# Patient Record
Sex: FEMALE | Race: WHITE | NOT HISPANIC OR LATINO | URBAN - METROPOLITAN AREA
[De-identification: names, ages, dates, MRNs, and addresses within clinical notes are randomized per-mention and may not be internally consistent; named-entity substitution may affect disease eponyms.]

---

## 2022-04-27 ENCOUNTER — TELEPHONE (OUTPATIENT)
Dept: DERMATOLOGY | Age: 62
End: 2022-04-27

## 2022-04-27 NOTE — TELEPHONE ENCOUNTER
Called to schedule an appt via Dr Laban Meigs wait list  Phone number on file is not in service  Yaakov fisher

## 2022-04-27 NOTE — LETTER
[facility] [address]   [phone]      April 27, 2022      Cherry Martinez    We have tried to reach you regarding scheduling an appointment, however the phone number we have on file is no longer in service   Please give our office a call at [phone] to schedule and update your file      Talk to you soon,    [facility]

## 2023-05-15 ENCOUNTER — OFFICE VISIT (OUTPATIENT)
Age: 63
End: 2023-05-15

## 2023-05-15 VITALS — HEIGHT: 65 IN | WEIGHT: 131 LBS | BODY MASS INDEX: 21.83 KG/M2 | TEMPERATURE: 98.3 F

## 2023-05-15 DIAGNOSIS — D22.70 MULTIPLE BENIGN MELANOCYTIC NEVI OF UPPER AND LOWER EXTREMITIES AND TRUNK: ICD-10-CM

## 2023-05-15 DIAGNOSIS — L30.9 DERMATITIS: ICD-10-CM

## 2023-05-15 DIAGNOSIS — D22.5 MULTIPLE BENIGN MELANOCYTIC NEVI OF UPPER AND LOWER EXTREMITIES AND TRUNK: ICD-10-CM

## 2023-05-15 DIAGNOSIS — L81.4 SOLAR LENTIGO: Primary | ICD-10-CM

## 2023-05-15 DIAGNOSIS — D23.9 DERMATOFIBROMA: ICD-10-CM

## 2023-05-15 DIAGNOSIS — D48.5 NEOPLASM OF UNCERTAIN BEHAVIOR OF SKIN: ICD-10-CM

## 2023-05-15 DIAGNOSIS — D22.60 MULTIPLE BENIGN MELANOCYTIC NEVI OF UPPER AND LOWER EXTREMITIES AND TRUNK: ICD-10-CM

## 2023-05-15 DIAGNOSIS — L82.1 SEBORRHEIC KERATOSIS: ICD-10-CM

## 2023-05-15 DIAGNOSIS — D18.01 CHERRY ANGIOMA: ICD-10-CM

## 2023-05-15 RX ORDER — ATENOLOL 25 MG/1
25 TABLET ORAL DAILY
COMMUNITY
Start: 2023-03-29

## 2023-05-15 RX ORDER — DIGOXIN 250 MCG
TABLET ORAL
COMMUNITY
Start: 2023-03-24

## 2023-05-15 RX ORDER — SERTRALINE HYDROCHLORIDE 150 MG/1
CAPSULE ORAL
COMMUNITY

## 2023-05-15 RX ORDER — HYDROXYCHLOROQUINE SULFATE 200 MG/1
TABLET, FILM COATED ORAL
COMMUNITY

## 2023-05-15 RX ORDER — IPRATROPIUM BROMIDE 42 UG/1
2 SPRAY, METERED NASAL DAILY
COMMUNITY
Start: 2023-04-17

## 2023-05-15 NOTE — PROGRESS NOTES
"[facility] Note     Patient Name: Fariba Andrea  Encounter Date: 05/15/2023     Have you been cared for by a [facility] in the last 3 years and, if so, which description applies to you? NO  I am considered a \"new\" patient and must complete all patient intake questions  I am FEMALE/of child-bearing potential     REVIEW OF SYSTEMS:  Have you recently had or currently have any of the following? · Recent fever or chills? No  · Any non-healing wound? No  · Are you pregnant or planning to become pregnant? No  · Are you currently or planning to be nursing or breast feeding? No   PAST MEDICAL HISTORY:  Have you personally ever had or currently have any of the following? If \"YES,\" then please provide more detail  · Skin cancer (such as Melanoma, Basal Cell Carcinoma, Squamous Cell Carcinoma? No  · Tuberculosis, HIV/AIDS, Hepatitis B or C: No  · Systemic Immunosuppression such as Diabetes, Biologic or Immunotherapy, Chemotherapy, Organ Transplantation, Bone Marrow Transplantation No  · Radiation Treatment No   FAMILY HISTORY:  Any \"first degree relatives\" (parent, brother, sister, or child) with the following? • Skin Cancer, Pancreatic or Other Cancer? YES, father with possible skin cancer    PATIENT EXPERIENCE:    • Do you want the Dermatologist to perform a COMPLETE skin exam today including a clinical examination under the \"bra and underwear\" areas? Yes  • If necessary, do we have your permission to call and leave a detailed message on your Preferred Phone number that includes your specific medical information?   Yes      Allergies   Allergen Reactions   • Iodinated Contrast Media Hives and Wheezing      Current Outpatient Medications:   •  atenolol (TENORMIN) 25 mg tablet, Take 25 mg by mouth daily, Disp: , Rfl:   •  digoxin (LANOXIN) 0 25 mg tablet, , Disp: , Rfl:   •  hydroxychloroquine (Plaquenil) 200 mg tablet, , Disp: , Rfl:   •  ipratropium (ATROVENT) 0 06 % nasal " "spray, 2 sprays daily, Disp: , Rfl:   •  Sertraline HCl 150 MG CAPS, , Disp: , Rfl:           • Whom besides the patient is providing clinical information about today's encounter?   o NO ADDITIONAL HISTORIAN (patient alone provided history)    Physical Exam and Assessment/Plan by Diagnosis:    MELANOCYTIC NEVI (\"Moles\")    Physical Exam:  • Anatomic Location Affected:   Mostly on sun-exposed areas of the trunk, upper and lower extremities   • Morphological Description:  Scattered, 1-4mm round to ovoid, symmetrical-appearing, even bordered, skin colored to dark brown macules/papules, mostly in sun-exposed areas  • Pertinent Positives:  • Pertinent Negatives: Additional History of Present Condition:  Here for skin exam     Assessment and Plan:  Based on a thorough discussion of this condition and the management approach to it (including a comprehensive discussion of the known risks, side effects and potential benefits of treatment), the patient (family) agrees to implement the following specific plan:  • Monitor for changes   • When outside we recommend using a wide brim hat, sunglasses, long sleeve and pants, sunscreen with SPF 01+ with reapplication every 2 hours, or SPF specific clothing    • Routine skin exam recommended every 2 years      Melanocytic Nevi  Melanocytic nevi (\"moles\") are caused by collections of the color producing skin cells, or melanocytes, in 1 area in the skin  They can range in color from pink to dark brown and be either raised or flat  Some moles are present at birth (I e , \"congenital nevi\"), while others come up later in life (i e , \"acquired nevi\")  Natasha Kiser exposure also stimulates the body to make more moles, ie the more sun you get the more moles you'll grow  Clinically distinguishing a healthy mole from melanoma may be difficult  The \"ABCDE's\" of moles have been suggested as a means of helping to alert a person to a suspicious mole and the possible increased risk of melanoma    " "    Asymmetry: Healthy moles tend to be symmetric, while melanomas are often asymmetric  Asymmetry means if you draw a line through the mole, the two halves do not match in color, size, shape, or surface texture Any mole that starts to demonstrate \"asymmetry\" should be examined promptly by a board certified dermatologist      Border: Healthy moles tend to have discrete, even borders  The border of a melanoma often blends into the normal skin and does not sharply delineate the mole from normal skin  Any mole that starts to demonstrate \"uneven borders\" should be examined promptly     Color: Healthy moles tend to be one color throughout  Melanomas tend to be made up of different colors ranging from dark black, blue, white, or red  Any mole that demonstrates a color change should be examined promptly    Diameter: Healthy moles tend to be smaller than 0 6 cm in size; an exception are \"congenital nevi\" that can be larger  Melanomas tend to grow and can often be greater than 0 6 cm (1/4 of an inch, or the size of a pencil eraser)  This is only a guideline, and many normal moles may be larger than 0 6 cm without being unhealthy  Any mole that starts to change in size (small to bigger or bigger to smaller) should be examined promptly    Evolving: Healthy moles tend to [place] the same  \"  Melanomas may often show signs of change or evolution such as a change in size, shape, color, or elevation  Any mole that starts to itch, bleed, crust, burn, hurt, or ulcerate or demonstrate a change or evolution should be examined promptly by a board certified dermatologist       What are atypical moles or dysplastic nevi? Dysplastic moles are moles that have some of the ABCDE  changes listed above but  are not cancerous  Sometimes a biopsy and microscopic examination are needed to determine the difference  They may indicate an increased risk of melanoma in that person, especially if there is a family history of melanoma      What is " "a Melanoma? The main concern when looking at a new or changing mole it to evaluate whether it may be a melanoma  The appearance of a \"new mole\" remains one of the most reliable methods for identifying a malignant melanoma  A melanoma is a type of skin cancer that can be deadly if it spreads throughout the body  The prognosis of a Melanoma depends on how deep it has penetrated in the skin  If caught early, they generally will not have had time to grow into the deeper layers of the skin and they cure rate is then very high  Once the melanoma grows deeper into the skin, the cure rate drops dramatically  Therefore, early detection and removal of a malignant melanoma results in a much better chance of complete cure  LENTIGO    Physical Exam:  • Anatomic Location Affected:  Trunk, upper and lower extremities   • Morphological Description:  Several tan brown macules   • Pertinent Positives:  • Pertinent Negatives: Additional History of Present Condition:  Here for skin exam     Assessment and Plan:  Based on a thorough discussion of this condition and the management approach to it (including a comprehensive discussion of the known risks, side effects and potential benefits of treatment), the patient (family) agrees to implement the following specific plan:  • Monitor for changes     What is a lentigo? A lentigo is a pigmented flat or slightly raised lesion with a clearly defined edge  Unlike an ephelis (freckle), it does not fade in the winter months  There are several kinds of lentigo  The name lentigo originally referred to its appearance resembling a small lentil  The plural of lentigo is lentigines, although “lentigos” is also in common use  Who gets lentigines? Lentigines can affect males and females of all ages and races  Solar lentigines are especially prevalent in fair skinned adults  Lentigines associated with syndromes are present at birth or arise during childhood      What causes " lentigines? Common forms of lentigo are due to exposure to ultraviolet radiation:  • Sun damage including sunburn   • Indoor tanning   • Phototherapy, especially photochemotherapy (PUVA)    Ionizing radiation, eg radiation therapy, can also cause lentigines  Several familial syndromes associated with widespread lentigines originate from mutations in Joey-MAP kinase, mTOR signaling and PTEN pathways  What are the clinical features of lentigines? Lentigines have been classified into several different types depending on what they look like, where they appear on the body, causative factors, and whether they are associated to other diseases or conditions  Lentigines may be solitary or more often, multiple  Most lentigines are smaller than 5 mm in diameter      Lentigo simplex  • A precursor to junctional naevus   • Arises during childhood and early adult life   • Found on trunk and limbs   • Small brown round or oval macule or thin plaque   • Jagged or smooth edge   • May have a dry surface   • May disappear in time  Solar lentigo  • A precursor to seborrhoeic keratosis   • Found on chronically sun exposed sites such as hands, face, lower legs   • May also follow sunburn to shoulders   • Yellow, light or dark brown regular or irregular macule or thin plaque   • May have a dry surface   • Often has moth-eaten outline   • Can slowly enlarge to several centimeters in diameter   • May disappear, often through the process known as lichenoid keratosis   • When atypical in appearance, may be difficult to distinguish from melanoma in situ  Ink spot lentigo  • Also known as reticulated lentigo   • Few in number compared to solar lentigines   • Follows sunburn in very fair skinned individuals   • Dark brown to black irregular ink spot-like macule  PUVA lentigo  • Similar to ink spot lentigo but follows photochemotherapy (PUVA)   • Location anywhere exposed to PUVA  Tanning bed lentigo  • Similar to ink spot lentigo but follows indoor tanning   • Location anywhere exposed to tanning bed  Radiation lentigo  • Occurs in site of irradiation (accidental or therapeutic)   • Associated with late-stage radiation dermatitis: epidermal atrophy, subcutaneous fibrosis, keratosis, telangiectasias  Melanotic macule  • Mucosal surfaces or adjacent glabrous skin eg lip, vulva, penis, anus   • Light to dark brown   • Also called mucosal melanosis  Generalised lentigines  • Found on any exposed or covered site from early childhood   • Small macules may merge to form larger patches   • Not associated with a syndrome   • Also called lentigines profusa, multiple lentigines  Agminated lentigines  • Naevoid eruption of lentigos confined to a single segmental area   • Sharp demarcation in midline   • May have associated neurological and developmental abnormalities  Patterned lentigines  • Inherited tendency to lentigines on face, lips, buttocks, palms, soles   • Recognised mainly in people of  ethnicity  Centrofacial neurodysraphic lentiginosis  Associated with mental retardation  Lentiginosis syndromes  • Syndromes include LEOPARD/[place], Peutz-Jeghers, Laugier-Hunziker, Moynahan, Xeroderma pigmentosum, myxoma syndromes (COVINGTON, NAME, Diallo), Ruvalcaba-Myhre-Chatterjee, Bannayan-Zonnana syndrome, Cowden disease (multiple hamartoma syndrome )   • Inheritance is autosomal dominant; sporadic cases common   • Widespread lentigines present at birth or arise in early childhood   • Associated with neural, endocrine, and mesenchymal tumors    How is the diagnosis made? Lentigines are usually diagnosed clinically by their typical appearance   Concern regarding possibility of melanoma may lead to:  • Dermatoscopy   • Diagnostic excision biopsy    Histopathology of a lentigo shows:  • Thickened epidermis   • An increased number of melanocytes along the basal layer of epidermis   • Unlike junctional melanocytic naevus, there are no nests of melanocytes "  • Increased melanin pigment within the keratinocytes   • Additional features depending on type of lentigo    In contrast, an ephelis (freckle) shows sun-induced increased melanin within the keratinocytes, without an increase in number of cells  What is the treatment for lentigines? Most lentigines are left alone  Attempts to lighten them may not be successful  The following approaches are used:  • SPF 50+ broad-spectrum sunscreen   • Hydroquinone bleaching cream   • Alpha hydroxy acids   • Vitamin C   • Retinoids   • Azelaic acid   • Chemical peels  Individual lesions can be permanently removed using:  • Cryotherapy   • Intense pulsed light   • Pigment lasers    How can lentigines be prevented? Lentigines associated with exposure ultraviolet radiation can be prevented by very careful sun protection  Clothing is more successful at preventing new lentigines than are sunscreens  What is the outlook for lentigines? Lentigines usually persist  They may increase in number with age and sun exposure  Some in sun-protected sites may fade and disappear  SEBORRHEIC KERATOSIS; NON-INFLAMED    Physical Exam:  • Anatomic Location Affected:  Trunk   • Morphological Description:  Flat and raised, waxy, smooth to warty textured, yellow to brownish-grey to dark brown to blackish, discrete, \"stuck-on\" appearing papules  • Pertinent Positives:  • Pertinent Negatives: Additional History of Present Condition:  Patient reports new bumps on the skin  Denies itch, burn, pain, bleeding or ulceration  Present constantly; nothing seems to make it worse or better  No prior treatment        Assessment and Plan:  Based on a thorough discussion of this condition and the management approach to it (including a comprehensive discussion of the known risks, side effects and potential benefits of treatment), the patient (family) agrees to implement the following specific plan:  • Reassured benign     Seborrheic Keratosis  A seborrheic " "keratosis is a harmless warty spot that appears during adult life as a common sign of skin aging  Seborrheic keratoses can arise on any area of skin, covered or uncovered, with the usual exception of the palms and soles  They do not arise from mucous membranes  Seborrheic keratoses can have highly variable appearance  Seborrheic keratoses are extremely common  It has been estimated that over 90% of adults over the age of 61 years have one or more of them  They occur in males and females of all races, typically beginning to erupt in the 35s or 45s  They are uncommon under the age of 21 years  The precise cause of seborrhoeic keratoses is not known  Seborrhoeic keratoses are considered degenerative in nature  As time goes by, seborrheic keratoses tend to become more numerous  Some people inherit a tendency to develop a very large number of them; some people may have hundreds of them  The name \"seborrheic keratosis\" is misleading, because these lesions are not limited to a seborrhoeic distribution (scalp, mid-face, chest, upper back), nor are they formed from sebaceous glands, nor are they associated with sebum -- which is greasy  Seborrheic keratosis may also be called \"SK,\" \"Seb K,\" \"basal cell papilloma,\" \"senile wart,\" or \"barnacle  \"      Researchers have noted:  • Eruptive seborrhoeic keratoses can follow sunburn or dermatitis  • Skin friction may be the reason they appear in body folds  • Viral cause (e g , human papillomavirus) seems unlikely  • Stable and clonal mutations or activation of FRFR3, PIK3CA, AUDIE, AKT1 and EGFR genes are found in seborrhoeic keratoses  • Seborrhoeic keratosis can arise from solar lentigo  • FRFR3 mutations also arise in solar lentigines   These mutations are associated with increased age and location on the head and neck, suggesting a role of ultraviolet radiation in these lesions  • Seborrheic keratoses do not harbour tumour suppressor gene mutations  • Epidermal growth " "factor receptor inhibitors, which are used to treat some cancers, often result in an increase in verrucal (warty) keratoses  There is no easy way to remove multiple lesions on a single occasion  Unless a specific lesion is \"inflamed\" and is causing pain or stinging/burning or is bleeding, most insurance companies do not authorize treatment  MA ANGIOMAS    Physical Exam:  • Anatomic Location Affected:  Scattered across the trunk and extremities   • Morphological Description:  red to purple colored macules  • Pertinent Positives:  • Pertinent Negatives: Additional History of Present Condition:  Patient is here for full skin exam      Assessment and Plan:  • Reassured benign     DERMATOFIBROMA    Physical Exam:  • Anatomic Location Affected:  Right inner calf   • Morphological Description:  Flat brown dermal papule  • Pertinent Positives:  • Pertinent Negatives: Additional History of Present Condition:  Occasionally shaves     Assessment and Plan:  Based on a thorough discussion of this condition and the management approach to it (including a comprehensive discussion of the known risks, side effects and potential benefits of treatment), the patient (family) agrees to implement the following specific plan:  • Reassured benign     Assessment and Plan:  A dermatofibroma is a common benign fibrous nodule that most often arises on the skin of the lower legs  A dermatofibroma is also called a \"cutaneous fibrous histiocytoma  \"  Dermatofibromas occur at all ages and in people of every ethnicity  They are more common in women than in men  It is not clear if dermatofibroma is a reactive process or if it is a neoplasm  The lesions are made up of proliferating fibroblasts  Histiocytes may also be involved  They are sometimes attributed to an insect bite or ingrownhair or local trauma, but not consistently  They may be more numerous in patients with altered immunity      Dermatofibromas most often occur on the " "legs and arms, but may also arise on the trunk or any site of the body  Typical clinical features include the following:  • People may have 1 or up to 15 lesions  • Size varies from 0 5-1 5 cm diameter; most lesions are 7-10 mm diameter  • They are firm nodules tethered to the skin surface and mobile over subcutaneous tissue  • The skin \"dimples\" on pinching the lesion  • Color may be pink to light brown in white skin, and dark brown to black in dark skin; some appear paler in the center  • They do not usually cause symptoms, but they are sometimes painful or itchy  • Because they are often raised lesions, they may be traumatized, for example by a razor  • Occasionally dozens may erupt within a few months, usually in the setting of immunosuppression (for example autoimmune disease, cancer or certain medications)  • Dermatofibroma does not give rise to cancer  However, occasionally, it may be mistaken for dermatofibrosarcoma or desmoplastic melanoma  A dermatofibroma is harmless and seldom causes any symptoms  Usually, only reassurance is needed  If it is nuisance or causing concern, the lesion can be removed surgically, resulting in a scar that is, by definition, usually longer in diameter than the widest portion of the dermatofibroma  Cryotherapy, shave biopsy and laser surgery are rarely completely successful  Skin punch biopsy or incisional biopsy may be undertaken if there is an atypical feature such as recent enlargement, ulceration, or asymmetrical structures and colours on dermatoscopy  NEOPLASM OF UNCERTAIN BEHAVIOR OF SKIN    Physical Exam:  • (Anatomic Location); (Size and Morphological Description); (Differential Diagnosis):  o Right preauricular accessory to tragus ; 6 mm firm papule; skin tag vs nevus   • Pertinent Positives:  • Pertinent Negatives:     Additional History of Present Condition:  Gets caught on hair brush    Assessment and Plan:  o Shave removal scheduled     Scribe " Attestation    I,:  Lindy Martines am acting as a scribe while in the presence of the attending physician :       I,:  Mariangel Sanchez MD personally performed the services described in this documentation    as scribed in my presence :

## 2023-05-15 NOTE — PATIENT INSTRUCTIONS
"  MELANOCYTIC NEVI (\"Moles\")  Assessment and Plan:  Based on a thorough discussion of this condition and the management approach to it (including a comprehensive discussion of the known risks, side effects and potential benefits of treatment), the patient (family) agrees to implement the following specific plan:  Monitor for changes   When outside we recommend using a wide brim hat, sunglasses, long sleeve and pants, sunscreen with SPF 61+ with reapplication every 2 hours, or SPF specific clothing    Routine skin exam recommended every 2 years      Melanocytic Nevi  Melanocytic nevi (\"moles\") are caused by collections of the color producing skin cells, or melanocytes, in 1 area in the skin  They can range in color from pink to dark brown and be either raised or flat  Some moles are present at birth (I e , \"congenital nevi\"), while others come up later in life (i e , \"acquired nevi\")  Sublette Shock exposure also stimulates the body to make more moles, ie the more sun you get the more moles you'll grow  Clinically distinguishing a healthy mole from melanoma may be difficult  The \"ABCDE's\" of moles have been suggested as a means of helping to alert a person to a suspicious mole and the possible increased risk of melanoma  Asymmetry: Healthy moles tend to be symmetric, while melanomas are often asymmetric  Asymmetry means if you draw a line through the mole, the two halves do not match in color, size, shape, or surface texture Any mole that starts to demonstrate \"asymmetry\" should be examined promptly by a board certified dermatologist      Border: Healthy moles tend to have discrete, even borders  The border of a melanoma often blends into the normal skin and does not sharply delineate the mole from normal skin  Any mole that starts to demonstrate \"uneven borders\" should be examined promptly     Color: Healthy moles tend to be one color throughout    Melanomas tend to be made up of different colors ranging from " "dark black, blue, white, or red  Any mole that demonstrates a color change should be examined promptly    Diameter: Healthy moles tend to be smaller than 0 6 cm in size; an exception are \"congenital nevi\" that can be larger  Melanomas tend to grow and can often be greater than 0 6 cm (1/4 of an inch, or the size of a pencil eraser)  This is only a guideline, and many normal moles may be larger than 0 6 cm without being unhealthy  Any mole that starts to change in size (small to bigger or bigger to smaller) should be examined promptly    Evolving: Healthy moles tend to [place] the same  \"  Melanomas may often show signs of change or evolution such as a change in size, shape, color, or elevation  Any mole that starts to itch, bleed, crust, burn, hurt, or ulcerate or demonstrate a change or evolution should be examined promptly by a board certified dermatologist       Nicholas Pour and Plan:  Reassured benign     SEBORRHEIC KERATOSIS; NON-INFLAMED    Assessment and Plan:  Based on a thorough discussion of this condition and the management approach to it (including a comprehensive discussion of the known risks, side effects and potential benefits of treatment), the patient (family) agrees to implement the following specific plan:  Reassured benign     Seborrheic Keratosis  A seborrheic keratosis is a harmless warty spot that appears during adult life as a common sign of skin aging  Seborrheic keratoses can arise on any area of skin, covered or uncovered, with the usual exception of the palms and soles  They do not arise from mucous membranes  Seborrheic keratoses can have highly variable appearance  Seborrheic keratoses are extremely common  It has been estimated that over 90% of adults over the age of 61 years have one or more of them  They occur in males and females of all races, typically beginning to erupt in the 35s or 45s  They are uncommon under the age of 21 years    The precise cause of " seborrhoeic keratoses is not known  Seborrhoeic keratoses are considered degenerative in nature  As time goes by, seborrheic keratoses tend to become more numerous  Some people inherit a tendency to develop a very large number of them; some people may have hundreds of them  LENTIGO    Assessment and Plan:  Based on a thorough discussion of this condition and the management approach to it (including a comprehensive discussion of the known risks, side effects and potential benefits of treatment), the patient (family) agrees to implement the following specific plan:  Monitor for changes     What is a lentigo? A lentigo is a pigmented flat or slightly raised lesion with a clearly defined edge  Unlike an ephelis (freckle), it does not fade in the winter months  There are several kinds of lentigo  The name lentigo originally referred to its appearance resembling a small lentil  The plural of lentigo is lentigines, although “lentigos” is also in common use  Who gets lentigines? Lentigines can affect males and females of all ages and races  Solar lentigines are especially prevalent in fair skinned adults  Lentigines associated with syndromes are present at birth or arise during childhood  What causes lentigines? Common forms of lentigo are due to exposure to ultraviolet radiation:  Sun damage including sunburn   Indoor tanning   Phototherapy, especially photochemotherapy (PUVA)    Ionizing radiation, eg radiation therapy, can also cause lentigines  Several familial syndromes associated with widespread lentigines originate from mutations in Joey-MAP kinase, mTOR signaling and PTEN pathways  What are the clinical features of lentigines? Lentigines have been classified into several different types depending on what they look like, where they appear on the body, causative factors, and whether they are associated to other diseases or conditions  Lentigines may be solitary or more often, multiple   Most "lentigines are smaller than 5 mm in diameter  DERMATOFIBROMA    Assessment and Plan:  Based on a thorough discussion of this condition and the management approach to it (including a comprehensive discussion of the known risks, side effects and potential benefits of treatment), the patient (family) agrees to implement the following specific plan:  Reassured benign     Assessment and Plan:  A dermatofibroma is a common benign fibrous nodule that most often arises on the skin of the lower legs  A dermatofibroma is also called a \"cutaneous fibrous histiocytoma  \"  Dermatofibromas occur at all ages and in people of every ethnicity  They are more common in women than in men  It is not clear if dermatofibroma is a reactive process or if it is a neoplasm  The lesions are made up of proliferating fibroblasts  Histiocytes may also be involved  They are sometimes attributed to an insect bite or ingrownhair or local trauma, but not consistently  They may be more numerous in patients with altered immunity  Dermatofibromas most often occur on the legs and arms, but may also arise on the trunk or any site of the body  Typical clinical features include the following:  People may have 1 or up to 15 lesions  Size varies from 0 5-1 5 cm diameter; most lesions are 7-10 mm diameter  They are firm nodules tethered to the skin surface and mobile over subcutaneous tissue  The skin \"dimples\" on pinching the lesion  Color may be pink to light brown in white skin, and dark brown to black in dark skin; some appear paler in the center  They do not usually cause symptoms, but they are sometimes painful or itchy  Because they are often raised lesions, they may be traumatized, for example by a razor  Occasionally dozens may erupt within a few months, usually in the setting of immunosuppression (for example autoimmune disease, cancer or certain medications)  Dermatofibroma does not give rise to cancer   However, occasionally, it " may be mistaken for dermatofibrosarcoma or desmoplastic melanoma  A dermatofibroma is harmless and seldom causes any symptoms  Usually, only reassurance is needed  If it is nuisance or causing concern, the lesion can be removed surgically, resulting in a scar that is, by definition, usually longer in diameter than the widest portion of the dermatofibroma  Cryotherapy, shave biopsy and laser surgery are rarely completely successful  Skin punch biopsy or incisional biopsy may be undertaken if there is an atypical feature such as recent enlargement, ulceration, or asymmetrical structures and colours on dermatoscopy      DERMATITIS     Assessment and Plan:  Based on a thorough discussion of this condition and the management approach to it (including a comprehensive discussion of the known risks, side effects and potential benefits of treatment), the patient (family) agrees to implement the following specific plan:  Over the counter hydrocortisone twice a day topically as needed discussed and recommended    NEOPLASM OF UNCERTAIN BEHAVIOR OF SKIN    Assessment and Plan:  Shave removal scheduled

## 2023-11-13 ENCOUNTER — PROCEDURE VISIT (OUTPATIENT)
Age: 63
End: 2023-11-13
Payer: COMMERCIAL

## 2023-11-13 VITALS — BODY MASS INDEX: 22.49 KG/M2 | HEIGHT: 65 IN | WEIGHT: 135 LBS

## 2023-11-13 DIAGNOSIS — D48.5 NEOPLASM OF UNCERTAIN BEHAVIOR OF SKIN: Primary | ICD-10-CM

## 2023-11-13 PROCEDURE — 11102 TANGNTL BX SKIN SINGLE LES: CPT | Performed by: DERMATOLOGY

## 2023-11-13 PROCEDURE — 88305 TISSUE EXAM BY PATHOLOGIST: CPT | Performed by: STUDENT IN AN ORGANIZED HEALTH CARE EDUCATION/TRAINING PROGRAM

## 2023-11-13 NOTE — PATIENT INSTRUCTIONS
III. POST-PROCEDURAL CARE (WHAT YOU WILL NEED TO DO "AFTER THE BIOPSY" TO OPTIMIZE HEALING)    Keep the area clean and dry. Try NOT to remove the bandage or get it wet for the first 24 hours. Gently clean the area and apply surgical ointment (such as Vaseline petrolatum ointment, which is available "over the counter" and not a prescription) to the biopsy site for up to 2 weeks straight. This acts to protect the wound from the outside world. Sutures are not usually placed in this procedure. If any sutures were placed, return for suture removal as instructed (generally 1 week for the face, 2 weeks for the body). Take Acetaminophen (Tylenol) for discomfort, if no contraindications. Ibuprofen or aspirin could make bleeding worse. Call our office immediately for signs of infection: fever, chills, increased redness, warmth, tenderness, discomfort/pain, or pus or foul smell coming from the wound. WHAT TO DO IF THERE IS ANY BLEEDING? If a small amount of bleeding is noticed, place a clean cloth over the area and apply firm pressure for ten minutes. Check the wound after 10 minutes of direct pressure. If bleeding persists, try one more time for an additional 10 minutes of direct pressure on the area. If the bleeding becomes heavier or does not stop after the second attempt, or if you have any other questions about this procedure, then please call your [facility] Dermatologist by calling [phone] (SKIN). I hereby acknowledge that I have reviewed and verified the site with my Dermatologist and have requested and authorized my Dermatologist to proceed with the procedure.

## 2023-11-13 NOTE — PROGRESS NOTES
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 The area was prepped and draped in the usual fashion. Anesthesia was obtained with 1% lidocaine with epinephrine. A shave biopsy to an appropriate sampling depth was obtained by Shave (Dermablade or 15 blade) The resulting wound was covered with surgical ointment and bandaged appropriately. The patient tolerated the procedure well without complications and was without signs of functional compromise. Specimen has been sent for review by Dermatopathology. Standard post-procedure care has been explained and has been included in written form within the patient's copy of Informed Consent. INFORMED CONSENT DISCUSSION AND POST-OPERATIVE INSTRUCTIONS FOR PATIENT    I.  RATIONALE FOR PROCEDURE  I understand that a skin biopsy allows the Dermatologist to test a lesion or rash under the microscope to obtain a diagnosis. It usually involves numbing the area with numbing medication and removing a small piece of skin; sometimes the area will be closed with sutures. In this specific procedure, sutures are not usually needed. If any sutures are placed, then they are usually need to be removed in 2 weeks or less. I understand that my Dermatologist recommends that a skin "shave" biopsy be performed today. A local anesthetic, similar to the kind that a dentist uses when filling a cavity, will be injected with a very small needle into the skin area to be sampled.   The injected skin and tissue underneath "will go to sleep” and become numb so no pain should be felt afterwards. An instrument shaped like a tiny "razor blade" (shave biopsy instrument) will be used to cut a small piece of tissue and skin from the area so that a sample of tissue can be taken and examined more closely under the microscope. A slight amount of bleeding will occur, but it will be stopped with direct pressure and a pressure bandage and any other appropriate methods. I understands that a scar will form where the wound was created. Surgical ointment will be applied to help protect the wound. Sutures are not usually needed. II.  RISKS AND POTENTIAL COMPLICATIONS   I understand the risks and potential complications of a skin biopsy include but are not limited to the following:  Bleeding  Infection  Pain  Scar/keloid  Skin discoloration  Incomplete Removal  Recurrence  Nerve Damage/Numbness/Loss of Function  Allergic Reaction to Anesthesia  Biopsies are diagnostic procedures and based on findings additional treatment or evaluation may be required  Loss or destruction of specimen resulting in no additional findings    My Dermatologist has explained to me the nature of the condition, the nature of the procedure, and the benefits to be reasonably expected compared with alternative approaches. My Dermatologist has discussed the likelihood of major risks or complications of this procedure including the specific risks listed above, such as bleeding, infection, and scarring/keloid. I understand that a scar is expected after this procedure. I understand that my physician cannot predict if the scar will form a "keloid," which extends beyond the borders of the wound that is created. A keloid is a thick, painful, and bumpy scar. A keloid can be difficult to treat, as it does not always respond well to therapy, which includes injecting cortisone directly into the keloid every few weeks.   While this usually reduces the pain and size of the scar, it does not eliminate it. I understand that photographs may be taken before and after the procedure. These will be maintained as part of the medical providers confidential records and may not be made available to me. I further authorize the medical provider to use the photographs for teaching purposes or to illustrate scientific papers, books, or lectures if in his/her judgment, medical research, education, or science may benefit from its use. I have had an opportunity to fully inquire about the risks and benefits of this procedure and its alternatives. I have been given ample time and opportunity to ask questions and to seek a second opinion if I wished to do so. I acknowledge that there have specifically been no guarantees as to the cosmetic results from the procedure. I am aware that with any procedure there is always the possibility of an unexpected complication. III. POST-PROCEDURAL CARE (WHAT YOU WILL NEED TO DO "AFTER THE BIOPSY" TO OPTIMIZE HEALING)    Keep the area clean and dry. Try NOT to remove the bandage or get it wet for the first 24 hours. Gently clean the area and apply surgical ointment (such as Vaseline petrolatum ointment, which is available "over the counter" and not a prescription) to the biopsy site for up to 2 weeks straight. This acts to protect the wound from the outside world. Sutures are not usually placed in this procedure. If any sutures were placed, return for suture removal as instructed (generally 1 week for the face, 2 weeks for the body). Take Acetaminophen (Tylenol) for discomfort, if no contraindications. Ibuprofen or aspirin could make bleeding worse. Call our office immediately for signs of infection: fever, chills, increased redness, warmth, tenderness, discomfort/pain, or pus or foul smell coming from the wound. WHAT TO DO IF THERE IS ANY BLEEDING?   If a small amount of bleeding is noticed, place a clean cloth over the area and apply firm pressure for ten minutes. Check the wound after 10 minutes of direct pressure. If bleeding persists, try one more time for an additional 10 minutes of direct pressure on the area. If the bleeding becomes heavier or does not stop after the second attempt, or if you have any other questions about this procedure, then please call your [facility] Dermatologist by calling [phone] (SKIN). I hereby acknowledge that I have reviewed and verified the site with my Dermatologist and have requested and authorized my Dermatologist to proceed with the procedure.     Scribe Attestation    I,:  Laura Villalpando am acting as a scribe while in the presence of the attending physician.:       I,:  Jey Sheehan MD personally performed the services described in this documentation    as scribed in my presence.:

## 2023-11-17 NOTE — RESULT ENCOUNTER NOTE
Tissue Exam: [id number]  Order: [id number]  Status: Final result      Visible to patient: No (inaccessible in [facility])      Dx: Neoplasm of uncertain behavior of skin    0 Result Notes     Component   Case Report  Surgical Pathology Report                         Case: [id number]                                  Authorizing Provider:  Ashley Lucas,   Collected:           11/13/2023 0851                                     MD                                                                          Ordering Location:     [facility]      Received:            11/13/2023 [facility]                                                                  Pathologist:           Yordan Aly MD                                                          Specimen:    Skin, Other, Specimen A: Right preauricular accessory to tragus                          Final Diagnosis  A. Skin, right preauricular accessory to tragus, shave biopsy:    Consistent with ACCESSORY TRAGUS.       Pathology c/w clinical

## 2025-04-23 ENCOUNTER — OFFICE VISIT (OUTPATIENT)
Age: 65
End: 2025-04-23
Payer: COMMERCIAL

## 2025-04-23 VITALS — HEIGHT: 65 IN | TEMPERATURE: 97.2 F | BODY MASS INDEX: 22.49 KG/M2 | WEIGHT: 135 LBS

## 2025-04-23 DIAGNOSIS — D22.62 MULTIPLE BENIGN MELANOCYTIC NEVI OF UPPER AND LOWER EXTREMITIES AND TRUNK: ICD-10-CM

## 2025-04-23 DIAGNOSIS — D18.01 CHERRY ANGIOMA: ICD-10-CM

## 2025-04-23 DIAGNOSIS — L82.1 SEBORRHEIC KERATOSIS: ICD-10-CM

## 2025-04-23 DIAGNOSIS — L81.4 SOLAR LENTIGO: Primary | ICD-10-CM

## 2025-04-23 DIAGNOSIS — D22.71 MULTIPLE BENIGN MELANOCYTIC NEVI OF UPPER AND LOWER EXTREMITIES AND TRUNK: ICD-10-CM

## 2025-04-23 DIAGNOSIS — D22.61 MULTIPLE BENIGN MELANOCYTIC NEVI OF UPPER AND LOWER EXTREMITIES AND TRUNK: ICD-10-CM

## 2025-04-23 DIAGNOSIS — D22.72 MULTIPLE BENIGN MELANOCYTIC NEVI OF UPPER AND LOWER EXTREMITIES AND TRUNK: ICD-10-CM

## 2025-04-23 DIAGNOSIS — L81.2 EPHELIS: ICD-10-CM

## 2025-04-23 DIAGNOSIS — D22.5 MULTIPLE BENIGN MELANOCYTIC NEVI OF UPPER AND LOWER EXTREMITIES AND TRUNK: ICD-10-CM

## 2025-04-23 PROCEDURE — 99213 OFFICE O/P EST LOW 20 MIN: CPT | Performed by: DERMATOLOGY

## 2025-04-23 NOTE — PROGRESS NOTES
"[facility] Dermatology Clinic Note     Patient Name: Opal Antonio  Encounter Date: 04/23/2025       Have you been cared for by a [facility] Dermatologist in the last 3 years and, if so, which description applies to you? Yes. I have been here within the last 3 years, and my medical history has NOT changed since that time. I am not of child-bearing potential.     REVIEW OF SYSTEMS:  Have you recently had or currently have any of the following? No changes in my recent health.   PAST MEDICAL HISTORY:  Have you personally ever had or currently have any of the following?  If \"YES,\" then please provide more detail. No changes in my medical history.   HISTORY OF IMMUNOSUPPRESSION: Do you have a history of any of the following:  Systemic Immunosuppression such as Diabetes, Biologic or Immunotherapy, Chemotherapy, Organ Transplantation, Bone Marrow Transplantation or Prednisone?  No     Answering \"YES\" requires the addition of the dotphrase \"IMMUNOSUPPRESSED\" as the first diagnosis of the patient's visit.   FAMILY HISTORY:  Any \"first degree relatives\" (parent, brother, sister, or child) with the following?    No changes in my family's known health.   PATIENT EXPERIENCE:    Do you want the Dermatologist to perform a COMPLETE skin exam today including a clinical examination under the \"bra and underwear\" areas?  Yes  If necessary, do we have your permission to call and leave a detailed message on your Preferred Phone number that includes your specific medical information?  Yes      Allergies   Allergen Reactions    Iodinated Contrast Media Hives and Wheezing      Current Outpatient Medications:     atenolol (TENORMIN) 25 mg tablet, Take 25 mg by mouth daily, Disp: , Rfl:     digoxin (LANOXIN) 0.25 mg tablet, , Disp: , Rfl:     hydroxychloroquine (Plaquenil) 200 mg tablet, , Disp: , Rfl:     ipratropium (ATROVENT) 0.06 % nasal spray, 2 sprays daily, Disp: , Rfl:     Sertraline HCl 150 MG CAPS, , Disp: , Rfl:     " "          Whom besides the patient is providing clinical information about today's encounter?   NO ADDITIONAL HISTORIAN (patient alone provided history)    Physical Exam and Assessment/Plan by Diagnosis:    EPHELIS (\"FRECKLE\")    Physical Exam:  Anatomic Location Affected, Morphological Description:  Face, back, extremities.  with scattered light brown macules     Assessment and Plan:  Based on a thorough discussion of this condition and the management approach to it (including a comprehensive discussion of the known risks, side effects and potential benefits of treatment), the patient (family) agrees to implement the following specific plan:  Benign, reassured.     An ephelis is a benign freckle, a small, light brown or tan dina on the skin. They are typically found on sun exposed skin such as they face or on the backside of the forearms. The freckles darken in response to the sun and fade with decreased UV exposure. There can be hundreds or thousands of them on exposed skin.    Ephelides are particularly common in fair-skinned children and are not present at birth. People with white skin that cannot tan often have red hair and numerous ephelides. Ephelides can also occur in other races that have dark brown or black hair.    Ephelides are an inherited characteristic. People with many ephelides have at least one copy of a variant MC1R gene, which is the same variant that causes red hair. Freckles in non-Caucasians are associated with a different variant of the gene.  The pigment-forming cells, melanocytes, produce more pigment than usual in ephelides. The pigment is packaged as melanosomes and distributed to surrounding keratinocytes.  Ephelides increase in number following exposure to ultraviolet radiation in sunlight.    Ephelides are found on sun-exposed sites, particularly the nose and cheeks.  They are prominent during summer and fade during winter.  Light to dark brown flat spots  3-10 mm in diameter  Multiple " spots may merge into large patches  Any shape; often round    Ephelides are usually diagnosed by a skin examination. However, if there is uncertainty, then diagnosis can be made via skin biopsy with the following features:  An increase in pigment is noted in otherwise normal skin  There is no increase in the number of melanocytes (unlike lentigines)    As they are an inherited characteristic, they cannot be prevented. However, the summer darkening can be reduced by careful sun protection.  They can be advised to protect affected areas using broad-spectrum, spf 50+ sunscreen.  Cosmetic concealers can be applied to the freckles  Laser treatment may result in temporary lightening of the freckles    Often, ephelides become less prominent in adulthood.       SEBORRHEIC KERATOSES  - Relevant exam: Lower back    Scattered over the trunk/extremities are waxy brown to black plaques and papules with stuck on appearance  - Exam and clinical history consistent with seborrheic keratoses  - Counseled that these are benign growths that do not require treatment  - Counseled that removal of lesions is considered cosmetic and so would incur a fee should patient elect to move forward.   - Patient to hold on treatments for now but will inform us should they desire additional treatments    MELANOCYTIC NEVI  -Relevant exam: Back, chest, extremities.   Scattered over the trunk/extremities are homogenously pigmented brown macules and papules. ELM performed and without concerning findings.  - Exam and clinical history consistent with melanocytic nevi  - Educated on the ABCDE's of melanoma; handout provided  - Counseled to return to clinic prior to scheduled appointment should any of these lesions change or should any new lesions of concern arise  - Counseled on use of sun protection daily. Reviewed latest FDA sunscreen guidelines, including use of broad spectrum (UVA and UVB blocking) sunscreen or sun protective clothing with SPF 30-50  every 2-3 hours and reapplied after exposure to water; use of photoprotective clothing, including a broad brim hat and UPF rated clothing if outdoors for several hours; avoid use of tanning beds as these pose significant risk for melanoma and skin cancer.    LENTIGINES  OTHER SKIN CHANGES DUE TO CHRONIC EXPOSURE TO NONIONIZING RADIATION  - Relevant exam: Back, abdomen, chest and extremities.   Over sun exposed areas are brown macules. ELM performed and without concerning findings.  - Exam and clinical history consistent with lentigines.  - Educated that these are indicative of prior sun exposure.   - Counseled to return to clinic prior to scheduled appointment should any of these lesions change or should any new lesions of concern arise.  - Recommended use of sunscreen as above and below.  - Counseled on use of sun protection daily. Reviewed latest FDA sunscreen guidelines, including use of broad spectrum (UVA and UVB blocking) sunscreen or sun protective clothing with SPF 30-50 every 2-3 hours and reapplied after exposure to water; use of photoprotective clothing, including a broad brim hat and UPF rated clothing if outdoors for several hours; avoid use of tanning beds as these pose significant risk for melanoma and skin cancer.    CHERRY ANGIOMAS  - Relevant exam: Scattered over the trunk/extremities are red papules  - Exam and clinical history consistent with cherry angiomas  - Educated that these are benign  - Educated that removal is considered aesthetic and would incur a fee.  - Patient does not wish to pursue removal at this time but will contact us should this change.    FLAT  WART  Physical Exam:  Anatomic Location Affected:  Medial aspect of the right knee   Morphological Description:  8 mm pink flat top papule     Assessment and Plan:  Based on a thorough discussion of this condition and the management approach to it (including a comprehensive discussion of the known risks, side effects and potential  benefits of treatment), the patient (family) agrees to implement the following specific plan:  Benign, reassured.  Will monitor at this time.      NO evidence of cutaneous malignancy on TBE   Scribe Attestation      I,:  Angela Solano MA am acting as a scribe while in the presence of the attending physician.:       I,:  Shelbie Jane MD personally performed the services described in this documentation    as scribed in my presence.:

## 2025-04-23 NOTE — PATIENT INSTRUCTIONS
"  EPHELIS (\"FRECKLE\")    Assessment and Plan:  Based on a thorough discussion of this condition and the management approach to it (including a comprehensive discussion of the known risks, side effects and potential benefits of treatment), the patient (family) agrees to implement the following specific plan:  Benign, reassured.     An ephelis is a benign freckle, a small, light brown or tan dina on the skin. They are typically found on sun exposed skin such as they face or on the backside of the forearms. The freckles darken in response to the sun and fade with decreased UV exposure. There can be hundreds or thousands of them on exposed skin.    Ephelides are particularly common in fair-skinned children and are not present at birth. People with white skin that cannot tan often have red hair and numerous ephelides. Ephelides can also occur in other races that have dark brown or black hair.    Ephelides are an inherited characteristic. People with many ephelides have at least one copy of a variant MC1R gene, which is the same variant that causes red hair. Freckles in non-Caucasians are associated with a different variant of the gene.  The pigment-forming cells, melanocytes, produce more pigment than usual in ephelides. The pigment is packaged as melanosomes and distributed to surrounding keratinocytes.  Ephelides increase in number following exposure to ultraviolet radiation in sunlight.    Ephelides are found on sun-exposed sites, particularly the nose and cheeks.  They are prominent during summer and fade during winter.  Light to dark brown flat spots  3-10 mm in diameter  Multiple spots may merge into large patches  Any shape; often round    Ephelides are usually diagnosed by a skin examination. However, if there is uncertainty, then diagnosis can be made via skin biopsy with the following features:  An increase in pigment is noted in otherwise normal skin  There is no increase in the number of melanocytes (unlike " lentigines)    As they are an inherited characteristic, they cannot be prevented. However, the summer darkening can be reduced by careful sun protection.  They can be advised to protect affected areas using broad-spectrum, spf 50+ sunscreen.  Cosmetic concealers can be applied to the freckles  Laser treatment may result in temporary lightening of the freckles    Often, ephelides become less prominent in adulthood.       SEBORRHEIC KERATOSES  - Relevant exam: Lower back    Scattered over the trunk/extremities are waxy brown to black plaques and papules with stuck on appearance  - Exam and clinical history consistent with seborrheic keratoses  - Counseled that these are benign growths that do not require treatment  - Counseled that removal of lesions is considered cosmetic and so would incur a fee should patient elect to move forward.   - Patient to hold on treatments for now but will inform us should they desire additional treatments    MELANOCYTIC NEVI  -Relevant exam: Back, chest, extremities.   Scattered over the trunk/extremities are homogenously pigmented brown macules and papules. ELM performed and without concerning findings.  - Exam and clinical history consistent with melanocytic nevi  - Educated on the ABCDE's of melanoma; handout provided  - Counseled to return to clinic prior to scheduled appointment should any of these lesions change or should any new lesions of concern arise  - Counseled on use of sun protection daily. Reviewed latest FDA sunscreen guidelines, including use of broad spectrum (UVA and UVB blocking) sunscreen or sun protective clothing with SPF 30-50 every 2-3 hours and reapplied after exposure to water; use of photoprotective clothing, including a broad brim hat and UPF rated clothing if outdoors for several hours; avoid use of tanning beds as these pose significant risk for melanoma and skin cancer.    LENTIGINES  OTHER SKIN CHANGES DUE TO CHRONIC EXPOSURE TO NONIONIZING RADIATION  -  Relevant exam: Back, abdomen, chest and extremities.   Over sun exposed areas are brown macules. ELM performed and without concerning findings.  - Exam and clinical history consistent with lentigines.  - Educated that these are indicative of prior sun exposure.   - Counseled to return to clinic prior to scheduled appointment should any of these lesions change or should any new lesions of concern arise.  - Recommended use of sunscreen as above and below.  - Counseled on use of sun protection daily. Reviewed latest FDA sunscreen guidelines, including use of broad spectrum (UVA and UVB blocking) sunscreen or sun protective clothing with SPF 30-50 every 2-3 hours and reapplied after exposure to water; use of photoprotective clothing, including a broad brim hat and UPF rated clothing if outdoors for several hours; avoid use of tanning beds as these pose significant risk for melanoma and skin cancer.    CHERRY ANGIOMAS  - Relevant exam: Scattered over the trunk/extremities are red papules  - Exam and clinical history consistent with cherry angiomas  - Educated that these are benign  - Educated that removal is considered aesthetic and would incur a fee.  - Patient does not wish to pursue removal at this time but will contact us should this change.    FLAT TOP WART    Assessment and Plan:  Based on a thorough discussion of this condition and the management approach to it (including a comprehensive discussion of the known risks, side effects and potential benefits of treatment), the patient (family) agrees to implement the following specific plan:  Benign, reassured.  Will monitor at this time.

## 2025-06-12 ENCOUNTER — TELEPHONE (OUTPATIENT)
Age: 65
End: 2025-06-12

## 2025-06-12 NOTE — TELEPHONE ENCOUNTER
Received call from Patient for Follow Up - SOC - spots on lower legs, pink and red. Scheduled 6/19/25 8:50 am Lucinda Olivares. Verified insurance, provided [place] addr.     Patient verbalized understanding.

## 2025-06-19 ENCOUNTER — TELEPHONE (OUTPATIENT)
Age: 65
End: 2025-06-19

## 2025-06-19 ENCOUNTER — OFFICE VISIT (OUTPATIENT)
Age: 65
End: 2025-06-19
Payer: COMMERCIAL

## 2025-06-19 VITALS — HEIGHT: 65 IN | BODY MASS INDEX: 21.83 KG/M2 | WEIGHT: 131 LBS | TEMPERATURE: 97.1 F

## 2025-06-19 DIAGNOSIS — L30.0 NUMMULAR ECZEMA: Primary | ICD-10-CM

## 2025-06-19 DIAGNOSIS — L81.4 LENTIGINES: ICD-10-CM

## 2025-06-19 PROCEDURE — 99214 OFFICE O/P EST MOD 30 MIN: CPT | Performed by: DERMATOLOGY

## 2025-06-19 RX ORDER — TRIAMCINOLONE ACETONIDE 1 MG/G
CREAM TOPICAL 2 TIMES DAILY
Qty: 80 G | Refills: 1 | Status: SHIPPED | OUTPATIENT
Start: 2025-06-19 | End: 2025-07-10

## 2025-06-19 NOTE — PROGRESS NOTES
"[facility] Dermatology Clinic Note     Patient Name: Opal Antonio  Encounter Date: 6/19/2025       Have you been cared for by a [facility] Dermatologist in the last 3 years and, if so, which description applies to you? Yes. I have been here within the last 3 years, and my medical history has NOT changed since that time. I am not of child-bearing potential.     REVIEW OF SYSTEMS:  Have you recently had or currently have any of the following? No changes in my recent health.   PAST MEDICAL HISTORY:  Have you personally ever had or currently have any of the following?  If \"YES,\" then please provide more detail. No changes in my medical history.   HISTORY OF IMMUNOSUPPRESSION: Do you have a history of any of the following:  Systemic Immunosuppression such as Diabetes, Biologic or Immunotherapy, Chemotherapy, Organ Transplantation, Bone Marrow Transplantation or Prednisone?  No     Answering \"YES\" requires the addition of the dotphrase \"IMMUNOSUPPRESSED\" as the first diagnosis of the patient's visit.   FAMILY HISTORY:  Any \"first degree relatives\" (parent, brother, sister, or child) with the following?    No changes in my family's known health.   PATIENT EXPERIENCE:    Do you want the Dermatologist to perform a COMPLETE skin exam today including a clinical examination under the \"bra and underwear\" areas?  NO  If necessary, do we have your permission to call and leave a detailed message on your Preferred Phone number that includes your specific medical information?  Yes      Allergies[1] Current Medications[2]              Whom besides the patient is providing clinical information about today's encounter?   NO ADDITIONAL HISTORIAN (patient alone provided history)    Physical Exam and Assessment/Plan by Diagnosis:    64 year old female patient presents today for a spots of concern on lower legs. Patient was last seen on 4/23/2025 for skin cancer screening.     NUMMULAR ECZEMA    Physical Exam:  Anatomic Location " Affected:  right and left pre tibial area  Morphological Description:  Erythematous scaly pATCHES, bilateral   Pertinent Positives:  Pertinent Negatives:    Additional History of Present Condition:  present on exam     Assessment and Plan:  Based on a thorough discussion of this condition and the management approach to it (including a comprehensive discussion of the known risks, side effects and potential benefits of treatment), the patient (family) agrees to implement the following specific plan:  Start triamcinolone 0.1% cream for three weeks   Follow up in 3 weeks   Possible cryotherapy on spots that do not improve    Numular eczema   Nummular dermatitis (or eczema) is also known as discoid eczema (or dermatitis). It has two forms:  Exudative (‘wet’) nummular dermatitis   Dry nummular dermatitis    Exudative nummular dermatitis  The exudative variant starts acutely and may persist for weeks, months and rarely years. Although it may arise at any age, most subjects are over 50 years. In children, it is often thought to be a type of atopic dermatitis, but in adults it doesn't appear to relate to atopy. It is more common in males than females.  The initial plaque may appear at the site of trauma or infection. For example:  Thermal burn   Scabies infestation   Varicose vein surgery   Insect bite   Impetigo    Nummular dermatitis is sometimes due to drug allergy (e.g. to interferon alpha, intravenous immunoglobulins, etanercept) or systematised contact allergy, especially to nickel, gold and mercury.  The initial lesions are papules or vesicles, which form confluent plaques. The plaques may be crusted, weeping or blistered and are intensely itchy. Secondary infection with pustules, pain and spreading erythema is not uncommon. Older lesions may be dry and excoriated.  Clusters of round or oval plaques may be localised to lower legs, the backs of the hands or other sites. Nummular dermatitis may also generalise to affect  scalp, face, trunk and limbs. Generally the eruption is scattered, but sometimes the plaques are distributed symmetrically. In-between skin appears normal.    Investigations  Skin swabs frequently culture abundant Staphylococcus aureus from exudative nummular dermatitis and sometimes from dry discoid dermatitis.  Skin scrapings for microscopy and fungal culture may be necessary to rule out tinea corporis.    Differential diagnosis  Discoid eczema is frequently confused with the following skin disorders:  Psoriasis (redder, scalier plaques, symmetrical distribution, typical psoriatic sites)   Tinea corporis (grouped lesions, scaly or pustular edge)   Contact dermatitis (irregular shaped and sized lesions, contact factors)   Lichen simplex (lichenified plaques, may co-exist with nummular dermatitis)   Stasis dermatitis (lower legs, circumferential, hyperpigmentation, lipodermatosclerosis)    Management  Management of nummular dermatitis may involve the following:    Skin emollients  Emollients include bath oils, soap substitutes and moisturizing creams. They can be applied to the dermatitis as frequently as required to relieve itching, scaling and dryness. Emollients should also be used on the unaffected skin to reduce dryness. It may be necessary to try several different products to find one that suits. Many people find one or more of the following helpful: glycerine and cetomacrogol cream, white soft paraffin/liquid paraffin mixed, fatty cream, wool fat lotions.  Topical steroids   Topical steroids are anti-inflammatory creams or ointments available on prescription which may clear the dermatitis and reduce irritation. The stronger products are applied to the patches just once or twice daily for 2-4 weeks. They may be repeated from time to time depending on flare ups. Mild ones such as hydrocortisone are safe for daily use if necessary.  Antibiotics   Antibiotics (most often flucloxacillin) are often prescribed if  the rash is blistered, sticky or crusted. Sometimes nummular eczema clears completely on oral antibiotics, only to recur when they are discontinued  Oral antihistamines   Antihistamine pills may reduce the itching, and are particularly helpful at night-time. They do not clear the dermatitis. Non-sedating antihistamines appear less useful for nummular eczema than first-generation antihistamines taken at night to help sleep.  Ultraviolet radiation (UV) treatment   Phototherapy several times weekly for 6-12 weeks can reduce the extent and severity of discoid eczema.  Steroid injections  Intralesional steroids are sometimes injected into one or two particularly stubborn areas of discoid eczema. This treatment is unsuitable for multiple lesions.  Oral steroids (very rarely)  Systemic steroids are reserved for severe and extensive cases of discoid eczema. They are usually prescribed for a few weeks while continuing steroid creams and emollients on residual dermatitis.  Other oral treatments   Persistent and troublesome nummular eczema is occasionally treated with methotrexate, azathioprine or ciclosporin. These medicines have important risks and side effects and require careful monitoring by a specialist dermatologist. They may be more suitable in many cases than long-term steroids.    Nummular eczema can usually be controlled with the above measures, although it has a tendency to recur when the treatment has been stopped. In most patients, nummular eczema eventually clears up completely.     LENTIGINES  OTHER SKIN CHANGES DUE TO CHRONIC EXPOSURE TO NONIONIZING RADIATION  - Relevant exam: on pretibial areas of left and right lower extremities  Over sun exposed areas are brown macules. ELM performed and without concerning findings.  - Exam and clinical history consistent with lentigines.  - Educated that these are indicative of prior sun exposure.   - Counseled to return to clinic prior to scheduled appointment should any  of these lesions change or should any new lesions of concern arise.  - Recommended use of sunscreen as above and below.  - Counseled on use of sun protection daily. Reviewed latest FDA sunscreen guidelines, including use of broad spectrum (UVA and UVB blocking) sunscreen or sun protective clothing with SPF 30-50 every 2-3 hours and reapplied after exposure to water; use of photoprotective clothing, including a broad brim hat and UPF rated clothing if outdoors for several hours; avoid use of tanning beds as these pose significant risk for melanoma and skin cancer.      Scribe Attestation      I,:   am acting as a scribe while in the presence of the attending physician.:       I,:   personally performed the services described in this documentation    as scribed in my presence.:                   [1]   Allergies  Allergen Reactions    Iodinated Contrast Media Hives and Wheezing   [2]   Current Outpatient Medications:     atenolol (TENORMIN) 25 mg tablet, Take 25 mg by mouth daily, Disp: , Rfl:     digoxin (LANOXIN) 0.25 mg tablet, , Disp: , Rfl:     hydroxychloroquine (Plaquenil) 200 mg tablet, , Disp: , Rfl:     ipratropium (ATROVENT) 0.06 % nasal spray, 2 sprays daily, Disp: , Rfl:     Sertraline HCl 150 MG CAPS, , Disp: , Rfl:

## 2025-06-19 NOTE — TELEPHONE ENCOUNTER
Patient called the pod looking for a medication that was to be given on office visit; looked into chart didn't see any medication given; please review

## 2025-06-19 NOTE — TELEPHONE ENCOUNTER
Patient called to check status of medication. I apologized and informed patient I would review with doctor to sign on order.    Patient will return to pharmacy tomorrow if possible to send it today to   [id number] - [place], [place] - [address]

## 2025-06-19 NOTE — PATIENT INSTRUCTIONS
NUMMULAR ECZEMA    Assessment and Plan:  Based on a thorough discussion of this condition and the management approach to it (including a comprehensive discussion of the known risks, side effects and potential benefits of treatment), the patient (family) agrees to implement the following specific plan:  Start triamcinolone 0.1% cream for three weeks   Follow up in 3 weeks   Possible cryotherapy on spots that do not improve    Numular eczema   Nummular dermatitis (or eczema) is also known as discoid eczema (or dermatitis). It has two forms:  Exudative (‘wet’) nummular dermatitis   Dry nummular dermatitis    Exudative nummular dermatitis  The exudative variant starts acutely and may persist for weeks, months and rarely years. Although it may arise at any age, most subjects are over 50 years. In children, it is often thought to be a type of atopic dermatitis, but in adults it doesn't appear to relate to atopy. It is more common in males than females.  The initial plaque may appear at the site of trauma or infection. For example:  Thermal burn   Scabies infestation   Varicose vein surgery   Insect bite   Impetigo    Nummular dermatitis is sometimes due to drug allergy (e.g. to interferon alpha, intravenous immunoglobulins, etanercept) or systematised contact allergy, especially to nickel, gold and mercury.  The initial lesions are papules or vesicles, which form confluent plaques. The plaques may be crusted, weeping or blistered and are intensely itchy. Secondary infection with pustules, pain and spreading erythema is not uncommon. Older lesions may be dry and excoriated.  Clusters of round or oval plaques may be localised to lower legs, the backs of the hands or other sites. Nummular dermatitis may also generalise to affect scalp, face, trunk and limbs. Generally the eruption is scattered, but sometimes the plaques are distributed symmetrically. In-between skin appears normal.    Investigations  Skin swabs frequently  culture abundant Staphylococcus aureus from exudative nummular dermatitis and sometimes from dry discoid dermatitis.  Skin scrapings for microscopy and fungal culture may be necessary to rule out tinea corporis.    Differential diagnosis  Discoid eczema is frequently confused with the following skin disorders:  Psoriasis (redder, scalier plaques, symmetrical distribution, typical psoriatic sites)   Tinea corporis (grouped lesions, scaly or pustular edge)   Contact dermatitis (irregular shaped and sized lesions, contact factors)   Lichen simplex (lichenified plaques, may co-exist with nummular dermatitis)   Stasis dermatitis (lower legs, circumferential, hyperpigmentation, lipodermatosclerosis)    Management  Management of nummular dermatitis may involve the following:    Skin emollients  Emollients include bath oils, soap substitutes and moisturizing creams. They can be applied to the dermatitis as frequently as required to relieve itching, scaling and dryness. Emollients should also be used on the unaffected skin to reduce dryness. It may be necessary to try several different products to find one that suits. Many people find one or more of the following helpful: glycerine and cetomacrogol cream, white soft paraffin/liquid paraffin mixed, fatty cream, wool fat lotions.  Topical steroids   Topical steroids are anti-inflammatory creams or ointments available on prescription which may clear the dermatitis and reduce irritation. The stronger products are applied to the patches just once or twice daily for 2-4 weeks. They may be repeated from time to time depending on flare ups. Mild ones such as hydrocortisone are safe for daily use if necessary.  Antibiotics   Antibiotics (most often flucloxacillin) are often prescribed if the rash is blistered, sticky or crusted. Sometimes nummular eczema clears completely on oral antibiotics, only to recur when they are discontinued  Oral antihistamines   Antihistamine pills may  reduce the itching, and are particularly helpful at night-time. They do not clear the dermatitis. Non-sedating antihistamines appear less useful for nummular eczema than first-generation antihistamines taken at night to help sleep.  Ultraviolet radiation (UV) treatment   Phototherapy several times weekly for 6-12 weeks can reduce the extent and severity of discoid eczema.  Steroid injections  Intralesional steroids are sometimes injected into one or two particularly stubborn areas of discoid eczema. This treatment is unsuitable for multiple lesions.  Oral steroids (very rarely)  Systemic steroids are reserved for severe and extensive cases of discoid eczema. They are usually prescribed for a few weeks while continuing steroid creams and emollients on residual dermatitis.  Other oral treatments   Persistent and troublesome nummular eczema is occasionally treated with methotrexate, azathioprine or ciclosporin. These medicines have important risks and side effects and require careful monitoring by a specialist dermatologist. They may be more suitable in many cases than long-term steroids.    Nummular eczema can usually be controlled with the above measures, although it has a tendency to recur when the treatment has been stopped. In most patients, nummular eczema eventually clears up completely.     LENTIGINES  OTHER SKIN CHANGES DUE TO CHRONIC EXPOSURE TO NONIONIZING RADIATION  - Relevant exam: on pretibial areas of left and right lower extremities  Over sun exposed areas are brown macules. ELM performed and without concerning findings.  - Exam and clinical history consistent with lentigines.  - Educated that these are indicative of prior sun exposure.   - Counseled to return to clinic prior to scheduled appointment should any of these lesions change or should any new lesions of concern arise.  - Recommended use of sunscreen as above and below.  - Counseled on use of sun protection daily. Reviewed latest FDA sunscreen  guidelines, including use of broad spectrum (UVA and UVB blocking) sunscreen or sun protective clothing with SPF 30-50 every 2-3 hours and reapplied after exposure to water; use of photoprotective clothing, including a broad brim hat and UPF rated clothing if outdoors for several hours; avoid use of tanning beds as these pose significant risk for melanoma and skin cancer.

## 2025-06-20 NOTE — TELEPHONE ENCOUNTER
Called patient and reviewed script was sent. She received notification from pharmacy and will pick it up later.    Patient has no further request at this moment.

## 2025-07-23 ENCOUNTER — TELEPHONE (OUTPATIENT)
Age: 65
End: 2025-07-23

## 2025-08-04 ENCOUNTER — OFFICE VISIT (OUTPATIENT)
Age: 65
End: 2025-08-04

## 2025-08-04 VITALS — BODY MASS INDEX: 22.49 KG/M2 | WEIGHT: 135 LBS | HEIGHT: 65 IN

## 2025-08-04 DIAGNOSIS — L30.0 NUMMULAR DERMATITIS: Primary | ICD-10-CM
